# Patient Record
Sex: FEMALE | Race: WHITE | Employment: UNEMPLOYED | URBAN - METROPOLITAN AREA
[De-identification: names, ages, dates, MRNs, and addresses within clinical notes are randomized per-mention and may not be internally consistent; named-entity substitution may affect disease eponyms.]

---

## 2017-04-26 PROBLEM — Z34.90 TERM PREGNANCY: Status: ACTIVE | Noted: 2017-04-26

## 2019-05-20 LAB
ABO/RH: NORMAL
ANTIBODY SCREEN: NORMAL
HEPATITIS C ANTIBODY INTERPRETATION: NORMAL

## 2019-05-21 LAB
BASOPHILS ABSOLUTE: 0 K/UL (ref 0–0.2)
BASOPHILS RELATIVE PERCENT: 0.5 %
EOSINOPHILS ABSOLUTE: 0.1 K/UL (ref 0–0.6)
EOSINOPHILS RELATIVE PERCENT: 1.1 %
HCT VFR BLD CALC: 39.8 % (ref 36–48)
HEMOGLOBIN: 13.9 G/DL (ref 12–16)
HEPATITIS B SURFACE ANTIGEN INTERPRETATION: NORMAL
LYMPHOCYTES ABSOLUTE: 2.2 K/UL (ref 1–5.1)
LYMPHOCYTES RELATIVE PERCENT: 25.9 %
MCH RBC QN AUTO: 31.4 PG (ref 26–34)
MCHC RBC AUTO-ENTMCNC: 34.9 G/DL (ref 31–36)
MCV RBC AUTO: 89.9 FL (ref 80–100)
MONOCYTES ABSOLUTE: 0.4 K/UL (ref 0–1.3)
MONOCYTES RELATIVE PERCENT: 4.8 %
NEUTROPHILS ABSOLUTE: 5.6 K/UL (ref 1.7–7.7)
NEUTROPHILS RELATIVE PERCENT: 67.7 %
PDW BLD-RTO: 12.7 % (ref 12.4–15.4)
PLATELET # BLD: 238 K/UL (ref 135–450)
PMV BLD AUTO: 8.2 FL (ref 5–10.5)
RBC # BLD: 4.43 M/UL (ref 4–5.2)
RUBELLA ANTIBODY IGG: 78.4 IU/ML
TOTAL SYPHILLIS IGG/IGM: NORMAL
WBC # BLD: 8.3 K/UL (ref 4–11)

## 2019-05-22 LAB
MISCELLANEOUS LAB TEST ORDER: NORMAL
PARVOVIRUS B19 IGG ANTIBODY: 7.34 IV
URINE CULTURE, ROUTINE: NORMAL

## 2019-05-31 LAB — GONADOTROPIN, CHORIONIC (HCG) QUANT: 1992 MIU/ML

## 2019-06-05 LAB — GONADOTROPIN, CHORIONIC (HCG) QUANT: 1401 MIU/ML

## 2019-08-13 LAB
ABO/RH: NORMAL
ANTIBODY SCREEN: NORMAL
HEPATITIS C ANTIBODY INTERPRETATION: NORMAL

## 2019-08-14 LAB
BASOPHILS ABSOLUTE: 0.1 K/UL (ref 0–0.2)
BASOPHILS RELATIVE PERCENT: 0.6 %
EOSINOPHILS ABSOLUTE: 0.1 K/UL (ref 0–0.6)
EOSINOPHILS RELATIVE PERCENT: 0.7 %
HCT VFR BLD CALC: 39.3 % (ref 36–48)
HEMOGLOBIN: 13.5 G/DL (ref 12–16)
HEPATITIS B SURFACE ANTIGEN INTERPRETATION: NORMAL
HIV AG/AB: NORMAL
HIV ANTIGEN: NORMAL
HIV-1 ANTIBODY: NORMAL
HIV-2 AB: NORMAL
LYMPHOCYTES ABSOLUTE: 1.9 K/UL (ref 1–5.1)
LYMPHOCYTES RELATIVE PERCENT: 23.5 %
MCH RBC QN AUTO: 30.5 PG (ref 26–34)
MCHC RBC AUTO-ENTMCNC: 34.4 G/DL (ref 31–36)
MCV RBC AUTO: 88.6 FL (ref 80–100)
MONOCYTES ABSOLUTE: 0.3 K/UL (ref 0–1.3)
MONOCYTES RELATIVE PERCENT: 3.7 %
NEUTROPHILS ABSOLUTE: 5.9 K/UL (ref 1.7–7.7)
NEUTROPHILS RELATIVE PERCENT: 71.5 %
PDW BLD-RTO: 13 % (ref 12.4–15.4)
PLATELET # BLD: 240 K/UL (ref 135–450)
PMV BLD AUTO: 8.3 FL (ref 5–10.5)
RBC # BLD: 4.44 M/UL (ref 4–5.2)
RUBELLA ANTIBODY IGG: 78.6 IU/ML
TOTAL SYPHILLIS IGG/IGM: NORMAL
WBC # BLD: 8.3 K/UL (ref 4–11)

## 2019-08-15 LAB
PARVOVIRUS B19 IGG ANTIBODY: 7 IV
URINE CULTURE, ROUTINE: NORMAL

## 2019-12-26 LAB
GLUCOSE CHALLENGE: 109 MG/DL
HCT VFR BLD CALC: 35.5 % (ref 36–48)
HEMOGLOBIN: 12.2 G/DL (ref 12–16)
MCH RBC QN AUTO: 31.9 PG (ref 26–34)
MCHC RBC AUTO-ENTMCNC: 34.4 G/DL (ref 31–36)
MCV RBC AUTO: 92.5 FL (ref 80–100)
PDW BLD-RTO: 13.3 % (ref 12.4–15.4)
PLATELET # BLD: 217 K/UL (ref 135–450)
PMV BLD AUTO: 8.3 FL (ref 5–10.5)
RBC # BLD: 3.84 M/UL (ref 4–5.2)
WBC # BLD: 10.5 K/UL (ref 4–11)

## 2020-03-02 ENCOUNTER — HOSPITAL ENCOUNTER (OUTPATIENT)
Age: 25
Discharge: HOME | End: 2020-03-02
Attending: OBSTETRICS & GYNECOLOGY | Admitting: OBSTETRICS & GYNECOLOGY
Payer: COMMERCIAL

## 2020-03-02 VITALS
DIASTOLIC BLOOD PRESSURE: 61 MMHG | RESPIRATION RATE: 20 BRPM | WEIGHT: 217 LBS | HEIGHT: 64 IN | TEMPERATURE: 97.1 F | SYSTOLIC BLOOD PRESSURE: 119 MMHG | BODY MASS INDEX: 37.05 KG/M2 | HEART RATE: 64 BPM

## 2020-03-02 LAB
BILIRUBIN URINE: NEGATIVE
BLOOD, URINE: ABNORMAL
CLARITY: CLEAR
COLOR: YELLOW
EPITHELIAL CELLS, UA: NORMAL /HPF (ref 0–5)
GLUCOSE URINE: NEGATIVE MG/DL
KETONES, URINE: NEGATIVE MG/DL
LEUKOCYTE ESTERASE, URINE: NEGATIVE
MICROSCOPIC EXAMINATION: YES
NITRITE, URINE: NEGATIVE
PH UA: 7 (ref 5–8)
PROTEIN UA: NEGATIVE MG/DL
RBC UA: NORMAL /HPF (ref 0–4)
SPECIFIC GRAVITY UA: <1.005 (ref 1–1.03)
URINE TYPE: ABNORMAL
UROBILINOGEN, URINE: 0.2 E.U./DL
WBC UA: NORMAL /HPF (ref 0–5)

## 2020-03-02 PROCEDURE — 99211 OFF/OP EST MAY X REQ PHY/QHP: CPT

## 2020-03-02 PROCEDURE — 96372 THER/PROPH/DIAG INJ SC/IM: CPT

## 2020-03-02 PROCEDURE — 59025 FETAL NON-STRESS TEST: CPT

## 2020-03-02 PROCEDURE — 81001 URINALYSIS AUTO W/SCOPE: CPT

## 2020-03-02 PROCEDURE — 6360000002 HC RX W HCPCS

## 2020-03-02 RX ORDER — AMOXICILLIN 500 MG/1
500 CAPSULE ORAL 2 TIMES DAILY
Status: ON HOLD | COMMUNITY
End: 2020-04-06 | Stop reason: ALTCHOICE

## 2020-03-02 RX ORDER — TERBUTALINE SULFATE 1 MG/ML
INJECTION, SOLUTION SUBCUTANEOUS
Status: COMPLETED
Start: 2020-03-02 | End: 2020-03-02

## 2020-03-02 RX ADMIN — TERBUTALINE SULFATE: 1 INJECTION SUBCUTANEOUS at 22:05

## 2020-03-03 NOTE — FLOWSHEET NOTE
Patient to triage from home complaining of contractions today since noon.   Patient denies vaginal leaking or bleeding and is feeling good fetal movement

## 2020-03-03 NOTE — H&P
Department of Obstetrics and Gynecology   Obstetrics History and Physical        CHIEF COMPLAINT:  contractions    HISTORY OF PRESENT ILLNESS:      The patient is a 30 y.o. female at 34w1d. OB History        3    Para   1    Term   1       0    AB   1    Living   1       SAB   0    TAB   0    Ectopic   0    Molar        Multiple   0    Live Births   1            Patient presents with a chief complaint as above and is being admitted for observation. Onset of contractions around noon. She spent the day watching 6 children on her feet. SHe is well hydrated. Denies bleeding, leaking fluid, dysuria. Admits to diarrhea since starting amoxicillin for sinus infection. Cervix was closed and thick per RN. Her contractions seemed to be decreasing. Estimated Due Date: Estimated Date of Delivery: 20    PRENATAL CARE:    Complicated by: prior CS for FTP    PAST OB HISTORY  OB History        3    Para   1    Term   1       0    AB   1    Living   1       SAB   0    TAB   0    Ectopic   0    Molar        Multiple   0    Live Births   1                Past Medical History:        Diagnosis Date    Knee cap dislocation 2012    Mental disorder     depression-takes prozac    Tear of MCL (medial collateral ligament) of knee 2012     Past Surgical History:        Procedure Laterality Date     SECTION, LOW TRANSVERSE      repeat scheduled 20     Allergies:  Patient has no known allergies.   Social History:    Social History     Socioeconomic History    Marital status:      Spouse name: Not on file    Number of children: Not on file    Years of education: Not on file    Highest education level: Not on file   Occupational History    Not on file   Social Needs    Financial resource strain: Not on file    Food insecurity:     Worry: Not on file     Inability: Not on file    Transportation needs:     Medical: Not on file     Non-medical: Not on file   Tobacco Use    Smoking status: Never Smoker    Smokeless tobacco: Never Used   Substance and Sexual Activity    Alcohol use: No    Drug use: No    Sexual activity: Not on file   Lifestyle    Physical activity:     Days per week: Not on file     Minutes per session: Not on file    Stress: Not on file   Relationships    Social connections:     Talks on phone: Not on file     Gets together: Not on file     Attends Protestant service: Not on file     Active member of club or organization: Not on file     Attends meetings of clubs or organizations: Not on file     Relationship status: Not on file    Intimate partner violence:     Fear of current or ex partner: Not on file     Emotionally abused: Not on file     Physically abused: Not on file     Forced sexual activity: Not on file   Other Topics Concern    Not on file   Social History Narrative    Not on file     Family History:       Problem Relation Age of Onset    Asthma Brother     High Cholesterol Paternal Grandmother     High Blood Pressure Paternal Grandmother     High Blood Pressure Paternal Grandfather      Medications Prior to Admission:  Medications Prior to Admission: amoxicillin (AMOXIL) 500 MG capsule, Take 500 mg by mouth 2 times daily  Prenatal MV-Min-Fe Fum-FA-DHA (PRENATAL 1 PO), Take 1 tablet by mouth daily  ibuprofen (ADVIL;MOTRIN) 800 MG tablet, Take 1 pill 3 times daily with food for 3 days then as needed  famotidine (PEPCID) 10 MG tablet, Take 10 mg by mouth 2 times daily  ferrous sulfate 325 (65 FE) MG tablet, Take 325 mg by mouth daily (with breakfast)    REVIEW OF SYSTEMS:    Constitutional: negative  Gastrointestinal: positive for abdominal pain  Genitourinary:negative    PHYSICAL EXAM:  Vitals:    03/02/20 2106   BP: 119/61   Pulse: 64   Resp: 20   Temp: 97.1 °F (36.2 °C)   TempSrc: Temporal   Weight: 217 lb (98.4 kg)   Height: 5' 4\" (1.626 m)       /61   Pulse 64   Temp 97.1 °F (36.2 °C) (Temporal)   Resp 20   Ht 5' 4\" (1.626 m)   Wt 217 lb (98.4 kg)   LMP 2019   BMI 37.25 kg/m²   General appearance: alert, appears stated age, cooperative and no distress  Abdomen: gravid uterus- moderate contraction palpated  Skin: Skin color, texture, turgor normal. No rashes or lesions    Fetal heart rate:  Reassuring. Contraction frequency:  3 minutes    Membranes:  Intact    General Labs:      U/A:    Lab Results   Component Value Date    COLORU YELLOW 2020    PROTEINU Negative 2020    PHUR 7.0 2020    WBCUA 3-5 2013    RBCUA 0-2 2013    MUCUS Rare 2013    BACTERIA 1+ 2013    CLARITYU Clear 2020    SPECGRAV <1.005 2020    LEUKOCYTESUR Negative 2020    UROBILINOGEN 0.2 2020    BILIRUBINUR Negative 2020    BLOODU SMALL 2020    GLUCOSEU Negative 2020       ASSESSMENT AND PLAN:  [id number] contractions- treat with Brethine and observe.

## 2020-03-12 ENCOUNTER — HOSPITAL ENCOUNTER (OUTPATIENT)
Age: 25
Discharge: HOME | End: 2020-03-12
Attending: OBSTETRICS & GYNECOLOGY | Admitting: OBSTETRICS & GYNECOLOGY
Payer: COMMERCIAL

## 2020-03-12 VITALS
TEMPERATURE: 97.3 F | DIASTOLIC BLOOD PRESSURE: 59 MMHG | HEART RATE: 111 BPM | RESPIRATION RATE: 20 BRPM | SYSTOLIC BLOOD PRESSURE: 123 MMHG

## 2020-03-12 PROBLEM — O09.213 RISK OF PRETERM LABOR IN THIRD TRIMESTER: Status: ACTIVE | Noted: 2020-03-12

## 2020-03-12 LAB
BACTERIA: ABNORMAL /HPF
BILIRUBIN URINE: NEGATIVE
BLOOD, URINE: ABNORMAL
CLARITY: ABNORMAL
COLOR: YELLOW
EPITHELIAL CELLS, UA: ABNORMAL /HPF (ref 0–5)
GLUCOSE URINE: NEGATIVE MG/DL
KETONES, URINE: NEGATIVE MG/DL
LEUKOCYTE ESTERASE, URINE: ABNORMAL
MICROSCOPIC EXAMINATION: YES
NITRITE, URINE: NEGATIVE
PH UA: 7.5 (ref 5–8)
PROTEIN UA: NEGATIVE MG/DL
RBC UA: ABNORMAL /HPF (ref 0–4)
SPECIFIC GRAVITY UA: <1.005 (ref 1–1.03)
URINE TYPE: ABNORMAL
UROBILINOGEN, URINE: 0.2 E.U./DL
WBC UA: ABNORMAL /HPF (ref 0–5)

## 2020-03-12 PROCEDURE — 59025 FETAL NON-STRESS TEST: CPT

## 2020-03-12 PROCEDURE — 81001 URINALYSIS AUTO W/SCOPE: CPT

## 2020-03-12 RX ORDER — FLUOXETINE 10 MG/1
10 CAPSULE ORAL DAILY
COMMUNITY

## 2020-03-12 ASSESSMENT — PAIN DESCRIPTION - DESCRIPTORS: DESCRIPTORS: CRAMPING;TIGHTNESS

## 2020-04-06 ENCOUNTER — HOSPITAL ENCOUNTER (INPATIENT)
Age: 25
LOS: 2 days | Discharge: HOME | End: 2020-04-08
Attending: OBSTETRICS & GYNECOLOGY | Admitting: OBSTETRICS & GYNECOLOGY
Payer: COMMERCIAL

## 2020-04-06 ENCOUNTER — ANESTHESIA (OUTPATIENT)
Dept: LABOR AND DELIVERY | Age: 25
End: 2020-04-06
Payer: COMMERCIAL

## 2020-04-06 ENCOUNTER — ANESTHESIA EVENT (OUTPATIENT)
Dept: LABOR AND DELIVERY | Age: 25
End: 2020-04-06
Payer: COMMERCIAL

## 2020-04-06 VITALS
RESPIRATION RATE: 16 BRPM | DIASTOLIC BLOOD PRESSURE: 73 MMHG | SYSTOLIC BLOOD PRESSURE: 120 MMHG | OXYGEN SATURATION: 100 %

## 2020-04-06 PROBLEM — Z98.891 HX OF CESAREAN SECTION: Status: ACTIVE | Noted: 2020-04-06

## 2020-04-06 PROBLEM — Z98.891 S/P CESAREAN SECTION: Status: ACTIVE | Noted: 2020-04-06

## 2020-04-06 LAB
ABO/RH: NORMAL
AMPHETAMINE SCREEN, URINE: NORMAL
ANTIBODY SCREEN: NORMAL
BARBITURATE SCREEN URINE: NORMAL
BENZODIAZEPINE SCREEN, URINE: NORMAL
BUPRENORPHINE URINE: NORMAL
CANNABINOID SCREEN URINE: NORMAL
COCAINE METABOLITE SCREEN URINE: NORMAL
HCT VFR BLD CALC: 36.2 % (ref 36–48)
HEMOGLOBIN: 12.3 G/DL (ref 12–16)
Lab: NORMAL
MCH RBC QN AUTO: 31.5 PG (ref 26–34)
MCHC RBC AUTO-ENTMCNC: 34 G/DL (ref 31–36)
MCV RBC AUTO: 92.8 FL (ref 80–100)
METHADONE SCREEN, URINE: NORMAL
OPIATE SCREEN URINE: NORMAL
OXYCODONE URINE: NORMAL
PDW BLD-RTO: 13.5 % (ref 12.4–15.4)
PH UA: 6.5
PHENCYCLIDINE SCREEN URINE: NORMAL
PLATELET # BLD: 194 K/UL (ref 135–450)
PMV BLD AUTO: 8.6 FL (ref 5–10.5)
PROPOXYPHENE SCREEN: NORMAL
RBC # BLD: 3.9 M/UL (ref 4–5.2)
TOTAL SYPHILLIS IGG/IGM: NORMAL
WBC # BLD: 12 K/UL (ref 4–11)

## 2020-04-06 PROCEDURE — 2500000003 HC RX 250 WO HCPCS: Performed by: NURSE ANESTHETIST, CERTIFIED REGISTERED

## 2020-04-06 PROCEDURE — 86850 RBC ANTIBODY SCREEN: CPT

## 2020-04-06 PROCEDURE — 1200000000 HC SEMI PRIVATE

## 2020-04-06 PROCEDURE — 6360000002 HC RX W HCPCS: Performed by: OBSTETRICS & GYNECOLOGY

## 2020-04-06 PROCEDURE — 6360000002 HC RX W HCPCS: Performed by: NURSE ANESTHETIST, CERTIFIED REGISTERED

## 2020-04-06 PROCEDURE — 6370000000 HC RX 637 (ALT 250 FOR IP): Performed by: OBSTETRICS & GYNECOLOGY

## 2020-04-06 PROCEDURE — 7100000001 HC PACU RECOVERY - ADDTL 15 MIN: Performed by: OBSTETRICS & GYNECOLOGY

## 2020-04-06 PROCEDURE — 2580000003 HC RX 258: Performed by: OBSTETRICS & GYNECOLOGY

## 2020-04-06 PROCEDURE — 80307 DRUG TEST PRSMV CHEM ANLYZR: CPT

## 2020-04-06 PROCEDURE — 2709999900 HC NON-CHARGEABLE SUPPLY: Performed by: OBSTETRICS & GYNECOLOGY

## 2020-04-06 PROCEDURE — 85027 COMPLETE CBC AUTOMATED: CPT

## 2020-04-06 PROCEDURE — 2500000003 HC RX 250 WO HCPCS: Performed by: OBSTETRICS & GYNECOLOGY

## 2020-04-06 PROCEDURE — 86901 BLOOD TYPING SEROLOGIC RH(D): CPT

## 2020-04-06 PROCEDURE — 3700000001 HC ADD 15 MINUTES (ANESTHESIA): Performed by: OBSTETRICS & GYNECOLOGY

## 2020-04-06 PROCEDURE — 86900 BLOOD TYPING SEROLOGIC ABO: CPT

## 2020-04-06 PROCEDURE — 7100000000 HC PACU RECOVERY - FIRST 15 MIN: Performed by: OBSTETRICS & GYNECOLOGY

## 2020-04-06 PROCEDURE — 86780 TREPONEMA PALLIDUM: CPT

## 2020-04-06 PROCEDURE — 36415 COLL VENOUS BLD VENIPUNCTURE: CPT

## 2020-04-06 PROCEDURE — 3609079900 HC CESAREAN SECTION: Performed by: OBSTETRICS & GYNECOLOGY

## 2020-04-06 PROCEDURE — 3700000000 HC ANESTHESIA ATTENDED CARE: Performed by: OBSTETRICS & GYNECOLOGY

## 2020-04-06 RX ORDER — PHENYLEPHRINE HYDROCHLORIDE 10 MG/ML
INJECTION INTRAVENOUS PRN
Status: DISCONTINUED | OUTPATIENT
Start: 2020-04-06 | End: 2020-04-06 | Stop reason: SDUPTHER

## 2020-04-06 RX ORDER — ACETAMINOPHEN 500 MG
1000 TABLET ORAL ONCE
Status: COMPLETED | OUTPATIENT
Start: 2020-04-06 | End: 2020-04-06

## 2020-04-06 RX ORDER — IBUPROFEN 800 MG/1
800 TABLET ORAL EVERY 8 HOURS SCHEDULED
Status: DISCONTINUED | OUTPATIENT
Start: 2020-04-06 | End: 2020-04-08 | Stop reason: HOSPADM

## 2020-04-06 RX ORDER — ONDANSETRON 2 MG/ML
INJECTION INTRAMUSCULAR; INTRAVENOUS PRN
Status: DISCONTINUED | OUTPATIENT
Start: 2020-04-06 | End: 2020-04-06 | Stop reason: SDUPTHER

## 2020-04-06 RX ORDER — MORPHINE SULFATE 10 MG/ML
2 INJECTION, SOLUTION INTRAMUSCULAR; INTRAVENOUS
Status: DISCONTINUED | OUTPATIENT
Start: 2020-04-06 | End: 2020-04-08 | Stop reason: HOSPADM

## 2020-04-06 RX ORDER — SODIUM CHLORIDE 0.9 % (FLUSH) 0.9 %
10 SYRINGE (ML) INJECTION EVERY 12 HOURS SCHEDULED
Status: DISCONTINUED | OUTPATIENT
Start: 2020-04-06 | End: 2020-04-08 | Stop reason: HOSPADM

## 2020-04-06 RX ORDER — IBUPROFEN 800 MG/1
800 TABLET ORAL EVERY 6 HOURS PRN
Qty: 120 TABLET | Refills: 3 | Status: SHIPPED | OUTPATIENT
Start: 2020-04-06 | End: 2021-04-04

## 2020-04-06 RX ORDER — LANOLIN 100 %
OINTMENT (GRAM) TOPICAL
Status: DISCONTINUED | OUTPATIENT
Start: 2020-04-06 | End: 2020-04-08 | Stop reason: HOSPADM

## 2020-04-06 RX ORDER — ACETAMINOPHEN 500 MG
1000 TABLET ORAL EVERY 8 HOURS SCHEDULED
Status: DISCONTINUED | OUTPATIENT
Start: 2020-04-06 | End: 2020-04-08 | Stop reason: HOSPADM

## 2020-04-06 RX ORDER — CEPHALEXIN 250 MG/1
500 CAPSULE ORAL EVERY 8 HOURS SCHEDULED
Status: COMPLETED | OUTPATIENT
Start: 2020-04-06 | End: 2020-04-08

## 2020-04-06 RX ORDER — KETOROLAC TROMETHAMINE 30 MG/ML
30 INJECTION, SOLUTION INTRAMUSCULAR; INTRAVENOUS EVERY 8 HOURS
Status: DISCONTINUED | OUTPATIENT
Start: 2020-04-06 | End: 2020-04-08 | Stop reason: HOSPADM

## 2020-04-06 RX ORDER — DOCUSATE SODIUM 100 MG/1
100 CAPSULE, LIQUID FILLED ORAL DAILY PRN
Qty: 60 CAPSULE | Refills: 1 | Status: SHIPPED | OUTPATIENT
Start: 2020-04-06

## 2020-04-06 RX ORDER — DOCUSATE SODIUM 100 MG/1
100 CAPSULE, LIQUID FILLED ORAL 2 TIMES DAILY
Status: DISCONTINUED | OUTPATIENT
Start: 2020-04-06 | End: 2020-04-08 | Stop reason: HOSPADM

## 2020-04-06 RX ORDER — TRISODIUM CITRATE DIHYDRATE AND CITRIC ACID MONOHYDRATE 500; 334 MG/5ML; MG/5ML
30 SOLUTION ORAL ONCE
Status: COMPLETED | OUTPATIENT
Start: 2020-04-06 | End: 2020-04-06

## 2020-04-06 RX ORDER — SODIUM CHLORIDE, SODIUM LACTATE, POTASSIUM CHLORIDE, AND CALCIUM CHLORIDE .6; .31; .03; .02 G/100ML; G/100ML; G/100ML; G/100ML
1000 INJECTION, SOLUTION INTRAVENOUS ONCE
Status: COMPLETED | OUTPATIENT
Start: 2020-04-06 | End: 2020-04-06

## 2020-04-06 RX ORDER — SODIUM CHLORIDE, SODIUM LACTATE, POTASSIUM CHLORIDE, CALCIUM CHLORIDE 600; 310; 30; 20 MG/100ML; MG/100ML; MG/100ML; MG/100ML
INJECTION, SOLUTION INTRAVENOUS CONTINUOUS
Status: DISCONTINUED | OUTPATIENT
Start: 2020-04-06 | End: 2020-04-06

## 2020-04-06 RX ORDER — POLYETHYLENE GLYCOL 3350 17 G/17G
17 POWDER, FOR SOLUTION ORAL DAILY
Status: DISCONTINUED | OUTPATIENT
Start: 2020-04-06 | End: 2020-04-08 | Stop reason: HOSPADM

## 2020-04-06 RX ORDER — OXYCODONE HYDROCHLORIDE 5 MG/1
10 TABLET ORAL EVERY 4 HOURS PRN
Status: DISCONTINUED | OUTPATIENT
Start: 2020-04-06 | End: 2020-04-08 | Stop reason: HOSPADM

## 2020-04-06 RX ORDER — DIPHENHYDRAMINE HYDROCHLORIDE 50 MG/ML
25 INJECTION INTRAMUSCULAR; INTRAVENOUS EVERY 6 HOURS PRN
Status: DISCONTINUED | OUTPATIENT
Start: 2020-04-06 | End: 2020-04-08 | Stop reason: HOSPADM

## 2020-04-06 RX ORDER — METOCLOPRAMIDE HYDROCHLORIDE 5 MG/ML
10 INJECTION INTRAMUSCULAR; INTRAVENOUS ONCE
Status: COMPLETED | OUTPATIENT
Start: 2020-04-06 | End: 2020-04-06

## 2020-04-06 RX ORDER — ACETAMINOPHEN 325 MG/1
650 TABLET ORAL EVERY 4 HOURS PRN
Status: DISCONTINUED | OUTPATIENT
Start: 2020-04-06 | End: 2020-04-08 | Stop reason: HOSPADM

## 2020-04-06 RX ORDER — METHYLERGONOVINE MALEATE 0.2 MG/ML
200 INJECTION INTRAVENOUS PRN
Status: DISCONTINUED | OUTPATIENT
Start: 2020-04-06 | End: 2020-04-08 | Stop reason: HOSPADM

## 2020-04-06 RX ORDER — DEXAMETHASONE SODIUM PHOSPHATE 4 MG/ML
INJECTION, SOLUTION INTRA-ARTICULAR; INTRALESIONAL; INTRAMUSCULAR; INTRAVENOUS; SOFT TISSUE PRN
Status: DISCONTINUED | OUTPATIENT
Start: 2020-04-06 | End: 2020-04-06 | Stop reason: SDUPTHER

## 2020-04-06 RX ORDER — OXYCODONE HYDROCHLORIDE 5 MG/1
5 TABLET ORAL EVERY 4 HOURS PRN
Status: DISCONTINUED | OUTPATIENT
Start: 2020-04-06 | End: 2020-04-08 | Stop reason: HOSPADM

## 2020-04-06 RX ORDER — SIMETHICONE 80 MG
80 TABLET,CHEWABLE ORAL EVERY 6 HOURS PRN
Status: DISCONTINUED | OUTPATIENT
Start: 2020-04-06 | End: 2020-04-08 | Stop reason: HOSPADM

## 2020-04-06 RX ORDER — FERROUS SULFATE 325(65) MG
325 TABLET ORAL 2 TIMES DAILY WITH MEALS
Status: DISCONTINUED | OUTPATIENT
Start: 2020-04-06 | End: 2020-04-08 | Stop reason: HOSPADM

## 2020-04-06 RX ORDER — KETOROLAC TROMETHAMINE 30 MG/ML
INJECTION, SOLUTION INTRAMUSCULAR; INTRAVENOUS PRN
Status: DISCONTINUED | OUTPATIENT
Start: 2020-04-06 | End: 2020-04-06 | Stop reason: SDUPTHER

## 2020-04-06 RX ORDER — ACETAMINOPHEN 500 MG
1000 TABLET ORAL EVERY 6 HOURS PRN
Qty: 120 TABLET | Refills: 3 | Status: SHIPPED | OUTPATIENT
Start: 2020-04-06

## 2020-04-06 RX ORDER — SODIUM CHLORIDE 0.9 % (FLUSH) 0.9 %
10 SYRINGE (ML) INJECTION PRN
Status: DISCONTINUED | OUTPATIENT
Start: 2020-04-06 | End: 2020-04-08 | Stop reason: HOSPADM

## 2020-04-06 RX ORDER — FLUOXETINE 10 MG/1
10 CAPSULE ORAL DAILY
Status: DISCONTINUED | OUTPATIENT
Start: 2020-04-06 | End: 2020-04-08 | Stop reason: HOSPADM

## 2020-04-06 RX ORDER — MORPHINE SULFATE 1 MG/ML
INJECTION, SOLUTION EPIDURAL; INTRATHECAL; INTRAVENOUS PRN
Status: DISCONTINUED | OUTPATIENT
Start: 2020-04-06 | End: 2020-04-06 | Stop reason: SDUPTHER

## 2020-04-06 RX ORDER — METRONIDAZOLE 250 MG/1
500 TABLET ORAL EVERY 8 HOURS SCHEDULED
Status: COMPLETED | OUTPATIENT
Start: 2020-04-06 | End: 2020-04-08

## 2020-04-06 RX ORDER — ONDANSETRON 2 MG/ML
4 INJECTION INTRAMUSCULAR; INTRAVENOUS EVERY 6 HOURS PRN
Status: DISCONTINUED | OUTPATIENT
Start: 2020-04-06 | End: 2020-04-08 | Stop reason: HOSPADM

## 2020-04-06 RX ORDER — BUPIVACAINE HYDROCHLORIDE 7.5 MG/ML
INJECTION, SOLUTION INTRASPINAL PRN
Status: DISCONTINUED | OUTPATIENT
Start: 2020-04-06 | End: 2020-04-06 | Stop reason: SDUPTHER

## 2020-04-06 RX ORDER — PRENATAL VIT/IRON FUM/FOLIC AC 27MG-0.8MG
1 TABLET ORAL DAILY
Status: DISCONTINUED | OUTPATIENT
Start: 2020-04-06 | End: 2020-04-08 | Stop reason: HOSPADM

## 2020-04-06 RX ORDER — CARBOPROST TROMETHAMINE 250 UG/ML
250 INJECTION, SOLUTION INTRAMUSCULAR PRN
Status: DISCONTINUED | OUTPATIENT
Start: 2020-04-06 | End: 2020-04-08 | Stop reason: HOSPADM

## 2020-04-06 RX ORDER — FAMOTIDINE 20 MG/1
20 TABLET, FILM COATED ORAL 2 TIMES DAILY
Status: DISCONTINUED | OUTPATIENT
Start: 2020-04-06 | End: 2020-04-08 | Stop reason: HOSPADM

## 2020-04-06 RX ORDER — OXYCODONE HYDROCHLORIDE 5 MG/1
5 CAPSULE ORAL EVERY 4 HOURS PRN
Qty: 20 CAPSULE | Refills: 0 | Status: SHIPPED | OUTPATIENT
Start: 2020-04-06 | End: 2020-04-13

## 2020-04-06 RX ORDER — KETOROLAC TROMETHAMINE 30 MG/ML
30 INJECTION, SOLUTION INTRAMUSCULAR; INTRAVENOUS EVERY 6 HOURS
Status: DISCONTINUED | OUTPATIENT
Start: 2020-04-06 | End: 2020-04-06 | Stop reason: SDUPTHER

## 2020-04-06 RX ORDER — SODIUM CHLORIDE, SODIUM LACTATE, POTASSIUM CHLORIDE, CALCIUM CHLORIDE 600; 310; 30; 20 MG/100ML; MG/100ML; MG/100ML; MG/100ML
INJECTION, SOLUTION INTRAVENOUS CONTINUOUS
Status: DISCONTINUED | OUTPATIENT
Start: 2020-04-06 | End: 2020-04-08 | Stop reason: HOSPADM

## 2020-04-06 RX ADMIN — SODIUM CHLORIDE, POTASSIUM CHLORIDE, SODIUM LACTATE AND CALCIUM CHLORIDE: 600; 310; 30; 20 INJECTION, SOLUTION INTRAVENOUS at 12:57

## 2020-04-06 RX ADMIN — SODIUM CITRATE AND CITRIC ACID MONOHYDRATE 30 ML: 500; 334 SOLUTION ORAL at 06:38

## 2020-04-06 RX ADMIN — MORPHINE SULFATE 0.2 MG: 1 INJECTION EPIDURAL; INTRATHECAL; INTRAVENOUS at 09:24

## 2020-04-06 RX ADMIN — ACETAMINOPHEN 1000 MG: 500 TABLET, FILM COATED ORAL at 16:41

## 2020-04-06 RX ADMIN — METOCLOPRAMIDE HYDROCHLORIDE 10 MG: 5 INJECTION INTRAMUSCULAR; INTRAVENOUS at 06:38

## 2020-04-06 RX ADMIN — SODIUM CHLORIDE, POTASSIUM CHLORIDE, SODIUM LACTATE AND CALCIUM CHLORIDE 1000 ML: 600; 310; 30; 20 INJECTION, SOLUTION INTRAVENOUS at 06:28

## 2020-04-06 RX ADMIN — BUPIVACAINE HYDROCHLORIDE 1.6 ML: 7.5 INJECTION, SOLUTION INTRASPINAL at 09:24

## 2020-04-06 RX ADMIN — DEXAMETHASONE SODIUM PHOSPHATE 8 MG: 4 INJECTION, SOLUTION INTRAMUSCULAR; INTRAVENOUS at 09:31

## 2020-04-06 RX ADMIN — ONDANSETRON 4 MG: 2 INJECTION INTRAMUSCULAR; INTRAVENOUS at 09:31

## 2020-04-06 RX ADMIN — FLUOXETINE 10 MG: 10 CAPSULE ORAL at 14:49

## 2020-04-06 RX ADMIN — SODIUM CHLORIDE, POTASSIUM CHLORIDE, SODIUM LACTATE AND CALCIUM CHLORIDE: 600; 310; 30; 20 INJECTION, SOLUTION INTRAVENOUS at 20:58

## 2020-04-06 RX ADMIN — PHENYLEPHRINE HYDROCHLORIDE 100 MCG: 10 INJECTION INTRAVENOUS at 09:31

## 2020-04-06 RX ADMIN — SODIUM CHLORIDE, POTASSIUM CHLORIDE, SODIUM LACTATE AND CALCIUM CHLORIDE: 600; 310; 30; 20 INJECTION, SOLUTION INTRAVENOUS at 06:52

## 2020-04-06 RX ADMIN — KETOROLAC TROMETHAMINE 30 MG: 30 INJECTION, SOLUTION INTRAMUSCULAR at 18:55

## 2020-04-06 RX ADMIN — FAMOTIDINE 20 MG: 10 INJECTION, SOLUTION INTRAVENOUS at 06:38

## 2020-04-06 RX ADMIN — SODIUM CHLORIDE, POTASSIUM CHLORIDE, SODIUM LACTATE AND CALCIUM CHLORIDE: 600; 310; 30; 20 INJECTION, SOLUTION INTRAVENOUS at 09:35

## 2020-04-06 RX ADMIN — KETOROLAC TROMETHAMINE 30 MG: 30 INJECTION, SOLUTION INTRAMUSCULAR; INTRAVENOUS at 10:15

## 2020-04-06 RX ADMIN — CEFAZOLIN 2 G: 10 INJECTION, POWDER, FOR SOLUTION INTRAVENOUS at 07:30

## 2020-04-06 RX ADMIN — CEPHALEXIN 500 MG: 250 CAPSULE ORAL at 14:49

## 2020-04-06 RX ADMIN — CEPHALEXIN 500 MG: 250 CAPSULE ORAL at 22:45

## 2020-04-06 RX ADMIN — METRONIDAZOLE 500 MG: 250 TABLET, FILM COATED ORAL at 14:49

## 2020-04-06 RX ADMIN — METRONIDAZOLE 500 MG: 250 TABLET, FILM COATED ORAL at 22:44

## 2020-04-06 RX ADMIN — ACETAMINOPHEN 1000 MG: 500 TABLET, FILM COATED ORAL at 06:38

## 2020-04-06 RX ADMIN — SODIUM CHLORIDE, POTASSIUM CHLORIDE, SODIUM LACTATE AND CALCIUM CHLORIDE: 600; 310; 30; 20 INJECTION, SOLUTION INTRAVENOUS at 12:45

## 2020-04-06 ASSESSMENT — PULMONARY FUNCTION TESTS
PIF_VALUE: 0
PIF_VALUE: 1
PIF_VALUE: 0
PIF_VALUE: 1
PIF_VALUE: 0
PIF_VALUE: 2
PIF_VALUE: 0

## 2020-04-06 ASSESSMENT — PAIN SCALES - GENERAL
PAINLEVEL_OUTOF10: 0
PAINLEVEL_OUTOF10: 3

## 2020-04-06 NOTE — FLOWSHEET NOTE
Pt up to chair with stand-by assist; tolerated well. Call light within reach, instructed to call for assistance back to bed, verbalized understanding. Will continue to monitor.

## 2020-04-06 NOTE — OP NOTE
massage was then used to  deliver the placenta. Uterus was then cleared of all clots and debris. Uterine incision was closed with two layers of 0 Vicryl. Some bleeding  from the superior aspect of suture site was controlled with 3-0 Vicryl  figure-of-eight suture. Abdomen was then irrigated, cleared of all  clots and debris. The uterine incision was then reinspected. A piece  of Interceed was then placed over the uterine incision. Gallo  protractor was removed. The peritoneum and muscle were then  reapproximated with 3-0 Vicryl. Fascia was then inspected and found to  be hemostatic. This was then closed with a running looped 0 PDS. The  subcutaneous tissue was then undermined to allow more cosmetic closure. This was undermined with Bovie, it was hemostatic. Then using running  3-0 Vicryl the Perla's fascia was then reapproximated to relieve  tension from the skin. Once this was done, the scar tissue from the  prior skin incision was then removed with Metzenbaum scissors. The skin  was then closed with running 4-0 Monocryl and was hemostatic. Pressure  dressing was then applied. The patient tolerated the procedure well,  went to PACU in stable condition.         Antonia Skinner MD    D: 04/06/2020 10:41:06       T: 04/06/2020 12:33:42     [id number]  Job#: [id number]     Doc#: [id number]    CC:

## 2020-04-06 NOTE — FLOWSHEET NOTE
Discharge prescriptions given to pt for Motrin, colace, Acetaminophen,and oxycodone with instructions on use and side effects. See AVS.  Pt verbalized understanding of medications. Discharge prescriptions given to pt with instructions on use and side effects. See AVS.  Pt verbalized understanding of medications.

## 2020-04-07 LAB
HCT VFR BLD CALC: 33.3 % (ref 36–48)
HEMOGLOBIN: 11.2 G/DL (ref 12–16)
MCH RBC QN AUTO: 31.1 PG (ref 26–34)
MCHC RBC AUTO-ENTMCNC: 33.7 G/DL (ref 31–36)
MCV RBC AUTO: 92.2 FL (ref 80–100)
PDW BLD-RTO: 13.3 % (ref 12.4–15.4)
PLATELET # BLD: 198 K/UL (ref 135–450)
PMV BLD AUTO: 8.7 FL (ref 5–10.5)
RBC # BLD: 3.62 M/UL (ref 4–5.2)
WBC # BLD: 18.8 K/UL (ref 4–11)

## 2020-04-07 PROCEDURE — 6360000002 HC RX W HCPCS: Performed by: OBSTETRICS & GYNECOLOGY

## 2020-04-07 PROCEDURE — 2580000003 HC RX 258: Performed by: OBSTETRICS & GYNECOLOGY

## 2020-04-07 PROCEDURE — 6370000000 HC RX 637 (ALT 250 FOR IP): Performed by: OBSTETRICS & GYNECOLOGY

## 2020-04-07 PROCEDURE — 1200000000 HC SEMI PRIVATE

## 2020-04-07 PROCEDURE — 85027 COMPLETE CBC AUTOMATED: CPT

## 2020-04-07 RX ADMIN — CEPHALEXIN 500 MG: 250 CAPSULE ORAL at 06:58

## 2020-04-07 RX ADMIN — FAMOTIDINE 20 MG: 20 TABLET, FILM COATED ORAL at 21:32

## 2020-04-07 RX ADMIN — DOCUSATE SODIUM 100 MG: 100 CAPSULE ORAL at 21:32

## 2020-04-07 RX ADMIN — ACETAMINOPHEN 1000 MG: 500 TABLET, FILM COATED ORAL at 16:23

## 2020-04-07 RX ADMIN — IBUPROFEN 800 MG: 800 TABLET, FILM COATED ORAL at 10:57

## 2020-04-07 RX ADMIN — OXYCODONE 5 MG: 5 TABLET ORAL at 23:02

## 2020-04-07 RX ADMIN — FLUOXETINE 10 MG: 10 CAPSULE ORAL at 09:11

## 2020-04-07 RX ADMIN — ACETAMINOPHEN 1000 MG: 500 TABLET, FILM COATED ORAL at 00:23

## 2020-04-07 RX ADMIN — METRONIDAZOLE 500 MG: 250 TABLET, FILM COATED ORAL at 06:58

## 2020-04-07 RX ADMIN — METRONIDAZOLE 500 MG: 250 TABLET, FILM COATED ORAL at 14:59

## 2020-04-07 RX ADMIN — CEPHALEXIN 500 MG: 250 CAPSULE ORAL at 21:32

## 2020-04-07 RX ADMIN — METRONIDAZOLE 500 MG: 250 TABLET, FILM COATED ORAL at 21:32

## 2020-04-07 RX ADMIN — Medication 10 ML: at 21:33

## 2020-04-07 RX ADMIN — ACETAMINOPHEN 1000 MG: 500 TABLET, FILM COATED ORAL at 23:54

## 2020-04-07 RX ADMIN — CEPHALEXIN 500 MG: 250 CAPSULE ORAL at 14:59

## 2020-04-07 RX ADMIN — DOCUSATE SODIUM 100 MG: 100 CAPSULE ORAL at 08:10

## 2020-04-07 RX ADMIN — ACETAMINOPHEN 1000 MG: 500 TABLET, FILM COATED ORAL at 08:10

## 2020-04-07 RX ADMIN — KETOROLAC TROMETHAMINE 30 MG: 30 INJECTION, SOLUTION INTRAMUSCULAR at 03:23

## 2020-04-07 RX ADMIN — IBUPROFEN 800 MG: 800 TABLET, FILM COATED ORAL at 18:54

## 2020-04-07 ASSESSMENT — PAIN SCALES - GENERAL
PAINLEVEL_OUTOF10: 4
PAINLEVEL_OUTOF10: 2
PAINLEVEL_OUTOF10: 1
PAINLEVEL_OUTOF10: 1
PAINLEVEL_OUTOF10: 3
PAINLEVEL_OUTOF10: 6
PAINLEVEL_OUTOF10: 3
PAINLEVEL_OUTOF10: 1

## 2020-04-07 NOTE — PLAN OF CARE
Jane Maier RN  Outcome: Ongoing     Problem: Urinary Retention:  Goal: Urinary elimination within specified parameters  Description: Urinary elimination within specified parameters  4/7/2020 0146 by Rochelle Chavarria RN  Outcome: Ongoing  4/6/2020 1459 by Kathy Hoskins RN  Outcome: Ongoing     Problem: Venous Thromboembolism:  Goal: Will show no signs or symptoms of venous thromboembolism  Description: Will show no signs or symptoms of venous thromboembolism  4/7/2020 0146 by Rochelle Chavarria RN  Outcome: Ongoing  4/6/2020 1459 by Kathy Hoskins RN  Outcome: Ongoing  Goal: Absence of signs or symptoms of impaired coagulation  Description: Absence of signs or symptoms of impaired coagulation  4/7/2020 0146 by Rochelle Chavarria RN  Outcome: Ongoing  4/6/2020 1459 by Kathy Hoskins RN  Outcome: Ongoing     Problem: Pain:  Goal: Pain level will decrease  Description: Pain level will decrease  4/7/2020 0146 by Rochelle Chavarria RN  Outcome: Ongoing  4/6/2020 1459 by Kathy Hoskins RN  Outcome: Ongoing  Goal: Control of acute pain  Description: Control of acute pain  Outcome: Ongoing  Goal: Control of chronic pain  Description: Control of chronic pain  Outcome: Ongoing     Problem: Anxiety:  Goal: Level of anxiety will decrease  Description: Level of anxiety will decrease  Outcome: Completed     Problem: Aspiration - Risk of:  Goal: Absence of aspiration  Description: Absence of aspiration  Outcome: Completed     Problem: Tissue Perfusion - Uteroplacental, Altered:  Goal: Absence of abnormal fetal heart rate pattern  Description: Absence of abnormal fetal heart rate pattern  Outcome: Completed

## 2020-04-07 NOTE — FLOWSHEET NOTE
Pt voided 500cc without any difficulties. Pt has been ambulating well in room by herself. No needs expressed at this time.

## 2020-04-07 NOTE — LACTATION NOTE
This note was copied from a baby's chart. LACTATION PROGRESS NOTE    Followed up on Baby Girl Zamzam Galeana to check on feeding status and inform family of [address] [facility] availability. MOB was finishing feeding as this LC entered room. Observed brief latch; NANCY, AS, SRS. MOB reported NB fed at breast  Over last hour; both sides, without difficulty. MOB reported another wet/stool diaper. MOB denies concerns with direct breastfeeding at present time. This  LC offered services, if needed while MB is in patient. MOB verbalized understanding, and denies needs.

## 2020-04-08 VITALS
SYSTOLIC BLOOD PRESSURE: 107 MMHG | HEIGHT: 64 IN | DIASTOLIC BLOOD PRESSURE: 70 MMHG | RESPIRATION RATE: 18 BRPM | HEART RATE: 58 BPM | BODY MASS INDEX: 37.73 KG/M2 | WEIGHT: 221 LBS | TEMPERATURE: 97.9 F | OXYGEN SATURATION: 97 %

## 2020-04-08 PROBLEM — O09.213 RISK OF PRETERM LABOR IN THIRD TRIMESTER: Status: RESOLVED | Noted: 2020-03-12 | Resolved: 2020-04-08

## 2020-04-08 PROBLEM — Z34.90 TERM PREGNANCY: Status: RESOLVED | Noted: 2017-04-26 | Resolved: 2020-04-08

## 2020-04-08 PROCEDURE — 6370000000 HC RX 637 (ALT 250 FOR IP): Performed by: OBSTETRICS & GYNECOLOGY

## 2020-04-08 RX ADMIN — IBUPROFEN 800 MG: 800 TABLET, FILM COATED ORAL at 03:00

## 2020-04-08 RX ADMIN — DOCUSATE SODIUM 100 MG: 100 CAPSULE ORAL at 08:50

## 2020-04-08 RX ADMIN — FLUOXETINE 10 MG: 10 CAPSULE ORAL at 08:58

## 2020-04-08 RX ADMIN — ACETAMINOPHEN 1000 MG: 500 TABLET, FILM COATED ORAL at 08:50

## 2020-04-08 RX ADMIN — OXYCODONE 5 MG: 5 TABLET ORAL at 08:51

## 2020-04-08 RX ADMIN — CEPHALEXIN 500 MG: 250 CAPSULE ORAL at 05:54

## 2020-04-08 RX ADMIN — METRONIDAZOLE 500 MG: 250 TABLET, FILM COATED ORAL at 05:54

## 2020-04-08 ASSESSMENT — PAIN SCALES - GENERAL
PAINLEVEL_OUTOF10: 2
PAINLEVEL_OUTOF10: 6

## 2020-04-08 NOTE — PROGRESS NOTES
IVF's, peck catheter,  And pulse ox discontinued at this time. CBC drawn and sent to lab. Assisted up to the BR and did pericare independently and returned to bed.
Subjective:     Postpartum Day 2:  Delivery    The patient feels well. The patient denies emotional concerns. Pain is well controlled with current medications. The baby iswell. Baby is feeding via breast. Urinary output is adequate. The patient is ambulating well. The patient is tolerating a normal diet. Flatus has been passed. Objective:      No data found. General:    alert, appears stated age and cooperative       Lochia:  appropriate   Uterine Fundus:   firm   Incision:  healing well, no significant drainage, no dehiscence, no significant erythema   DVT Evaluation:  No evidence of DVT seen on physical exam.     Lab Results   Component Value Date    WBC 18.8 (H) 2020    HGB 11.2 (L) 2020    HCT 33.3 (L) 2020    MCV 92.2 2020     2020        Assessment:     Status post  section. Doing well postoperatively. Plan:     Discharge home with standard precautions and return to clinic in 4-6 weeks.
mg, Oral, Q4H PRN, Ketan Black MD    acetaminophen (TYLENOL) tablet 650 mg, 650 mg, Oral, Q4H PRN, Ketan Black MD    docusate sodium (COLACE) capsule 100 mg, 100 mg, Oral, BID, Ketan Black MD, 100 mg at 04/07/20 0810    oxytocin (PITOCIN) 30 units in 500 mL infusion, 1 jeffery-units/min, Intravenous, Continuous, Ketan Black MD    methylergonovine (METHERGINE) injection 200 mcg, 200 mcg, Intramuscular, PRN, Ketan Black MD    carboprost (HEMABATE) injection 250 mcg, 250 mcg, Intramuscular, PRN, Ketan Black MD    lanolin ointment, , Topical, Q1H PRN, Ketan Black MD    measles, mumps & rubella vaccine (MMR) injection 0.5 mL, 0.5 mL, Subcutaneous, Prior to discharge, Ketan Black MD    Tetanus-Diphth-Acell Pertussis (BOOSTRIX) injection 0.5 mL, 0.5 mL, Intramuscular, Prior to discharge, Ketan Black MD    morphine injection 2 mg, 2 mg, Intravenous, Q3H PRN, Ketan Black MD    [facility]) chewable tablet 80 mg, 80 mg, Oral, Q6H PRN, Ketan Black MD    polyethylene glycol [facility]) packet 17 g, 17 g, Oral, Daily, Ketan Black MD    magnesium hydroxide (MILK OF MAGNESIA) 400 MG/5ML suspension 30 mL, 30 mL, Oral, Daily PRN, Ketan Black MD    diphenhydrAMINE (BENADRYL) injection 25 mg, 25 mg, Intravenous, Q6H PRN, Ketan Black MD    ondansetron [facility]) injection 4 mg, 4 mg, Intravenous, Q6H PRN, Ketan Black MD    famotidine (PEPCID) tablet 20 mg, 20 mg, Oral, BID, Ketan Black MD    prenatal vitamin tablet 1 tablet, 1 tablet, Oral, Daily, Ketan Black MD    ferrous sulfate (IRON 325) tablet 325 mg, 325 mg, Oral, BID WC, Ketan Black MD    cephALEXin [facility]) capsule 500 mg, 500 mg, Oral, 3 times per day, 500 mg at 04/07/20 0658 **AND** metroNIDAZOLE (FLAGYL) tablet 500 mg, 500 mg, Oral, 3 times per day, Ketan Black MD, 500 mg at 04/07/20 4203     Assessment/Plan:      Post

## 2020-04-09 ENCOUNTER — FOLLOWUP TELEPHONE ENCOUNTER (OUTPATIENT)
Dept: OBGYN | Age: 25
End: 2020-04-09

## 2020-04-14 ENCOUNTER — FOLLOWUP TELEPHONE ENCOUNTER (OUTPATIENT)
Dept: OBGYN | Age: 25
End: 2020-04-14

## 2021-04-04 ENCOUNTER — HOSPITAL ENCOUNTER (EMERGENCY)
Age: 26
Discharge: HOME | End: 2021-04-04
Attending: EMERGENCY MEDICINE
Payer: COMMERCIAL

## 2021-04-04 ENCOUNTER — APPOINTMENT (OUTPATIENT)
Dept: CT IMAGING | Age: 26
End: 2021-04-04
Payer: COMMERCIAL

## 2021-04-04 VITALS
RESPIRATION RATE: 16 BRPM | TEMPERATURE: 98.3 F | BODY MASS INDEX: 33.72 KG/M2 | HEART RATE: 58 BPM | DIASTOLIC BLOOD PRESSURE: 63 MMHG | OXYGEN SATURATION: 100 % | HEIGHT: 64 IN | WEIGHT: 197.53 LBS | SYSTOLIC BLOOD PRESSURE: 105 MMHG

## 2021-04-04 DIAGNOSIS — N13.30 HYDRONEPHROSIS OF LEFT KIDNEY: ICD-10-CM

## 2021-04-04 DIAGNOSIS — N20.0 LEFT NEPHROLITHIASIS: Primary | ICD-10-CM

## 2021-04-04 LAB
A/G RATIO: 1.6 (ref 1.1–2.2)
ALBUMIN SERPL-MCNC: 4.1 G/DL (ref 3.4–5)
ALP BLD-CCNC: 71 U/L (ref 40–129)
ALT SERPL-CCNC: 8 U/L (ref 10–40)
ANION GAP SERPL CALCULATED.3IONS-SCNC: 12 MMOL/L (ref 3–16)
AST SERPL-CCNC: 13 U/L (ref 15–37)
BACTERIA: ABNORMAL /HPF
BASOPHILS ABSOLUTE: 0 K/UL (ref 0–0.2)
BASOPHILS RELATIVE PERCENT: 0.3 %
BILIRUB SERPL-MCNC: 0.4 MG/DL (ref 0–1)
BILIRUBIN URINE: NEGATIVE
BLOOD, URINE: ABNORMAL
BUN BLDV-MCNC: 17 MG/DL (ref 7–20)
CALCIUM SERPL-MCNC: 9.2 MG/DL (ref 8.3–10.6)
CHLORIDE BLD-SCNC: 107 MMOL/L (ref 99–110)
CLARITY: CLEAR
CO2: 21 MMOL/L (ref 21–32)
COLOR: YELLOW
CREAT SERPL-MCNC: 1 MG/DL (ref 0.6–1.1)
EOSINOPHILS ABSOLUTE: 0.1 K/UL (ref 0–0.6)
EOSINOPHILS RELATIVE PERCENT: 0.8 %
EPITHELIAL CELLS, UA: 4 /HPF (ref 0–5)
GFR AFRICAN AMERICAN: >60
GFR NON-AFRICAN AMERICAN: >60
GLOBULIN: 2.6 G/DL
GLUCOSE BLD-MCNC: 108 MG/DL (ref 70–99)
GLUCOSE URINE: NEGATIVE MG/DL
HCG(URINE) PREGNANCY TEST: NEGATIVE
HCT VFR BLD CALC: 41.1 % (ref 36–48)
HEMOGLOBIN: 14.3 G/DL (ref 12–16)
HYALINE CASTS: 2 /LPF (ref 0–8)
KETONES, URINE: NEGATIVE MG/DL
LEUKOCYTE ESTERASE, URINE: ABNORMAL
LYMPHOCYTES ABSOLUTE: 2.1 K/UL (ref 1–5.1)
LYMPHOCYTES RELATIVE PERCENT: 20 %
MCH RBC QN AUTO: 30.5 PG (ref 26–34)
MCHC RBC AUTO-ENTMCNC: 34.7 G/DL (ref 31–36)
MCV RBC AUTO: 87.9 FL (ref 80–100)
MICROSCOPIC EXAMINATION: YES
MONOCYTES ABSOLUTE: 0.5 K/UL (ref 0–1.3)
MONOCYTES RELATIVE PERCENT: 4.5 %
NEUTROPHILS ABSOLUTE: 7.7 K/UL (ref 1.7–7.7)
NEUTROPHILS RELATIVE PERCENT: 74.4 %
NITRITE, URINE: NEGATIVE
PDW BLD-RTO: 13.1 % (ref 12.4–15.4)
PH UA: 6.5 (ref 5–8)
PLATELET # BLD: 235 K/UL (ref 135–450)
PMV BLD AUTO: 8.2 FL (ref 5–10.5)
POTASSIUM REFLEX MAGNESIUM: 3.9 MMOL/L (ref 3.5–5.1)
PROTEIN UA: ABNORMAL MG/DL
RBC # BLD: 4.68 M/UL (ref 4–5.2)
RBC UA: 9 /HPF (ref 0–4)
SODIUM BLD-SCNC: 140 MMOL/L (ref 136–145)
SPECIFIC GRAVITY UA: 1.02 (ref 1–1.03)
TOTAL PROTEIN: 6.7 G/DL (ref 6.4–8.2)
URINE REFLEX TO CULTURE: ABNORMAL
URINE TYPE: ABNORMAL
UROBILINOGEN, URINE: 0.2 E.U./DL
WBC # BLD: 10.3 K/UL (ref 4–11)
WBC UA: 5 /HPF (ref 0–5)

## 2021-04-04 PROCEDURE — 99284 EMERGENCY DEPT VISIT MOD MDM: CPT

## 2021-04-04 PROCEDURE — 74176 CT ABD & PELVIS W/O CONTRAST: CPT

## 2021-04-04 PROCEDURE — 85025 COMPLETE CBC W/AUTO DIFF WBC: CPT

## 2021-04-04 PROCEDURE — 81001 URINALYSIS AUTO W/SCOPE: CPT

## 2021-04-04 PROCEDURE — 84703 CHORIONIC GONADOTROPIN ASSAY: CPT

## 2021-04-04 PROCEDURE — 80053 COMPREHEN METABOLIC PANEL: CPT

## 2021-04-04 PROCEDURE — 6360000002 HC RX W HCPCS: Performed by: EMERGENCY MEDICINE

## 2021-04-04 PROCEDURE — 96374 THER/PROPH/DIAG INJ IV PUSH: CPT

## 2021-04-04 PROCEDURE — 96375 TX/PRO/DX INJ NEW DRUG ADDON: CPT

## 2021-04-04 PROCEDURE — 2580000003 HC RX 258: Performed by: EMERGENCY MEDICINE

## 2021-04-04 RX ORDER — TAMSULOSIN HYDROCHLORIDE 0.4 MG/1
0.4 CAPSULE ORAL DAILY
Qty: 5 CAPSULE | Refills: 0 | Status: SHIPPED | OUTPATIENT
Start: 2021-04-04 | End: 2021-05-09 | Stop reason: ALTCHOICE

## 2021-04-04 RX ORDER — KETOROLAC TROMETHAMINE 30 MG/ML
30 INJECTION, SOLUTION INTRAMUSCULAR; INTRAVENOUS ONCE
Status: COMPLETED | OUTPATIENT
Start: 2021-04-04 | End: 2021-04-04

## 2021-04-04 RX ORDER — KETOROLAC TROMETHAMINE 10 MG/1
10 TABLET, FILM COATED ORAL EVERY 6 HOURS PRN
Qty: 20 TABLET | Refills: 0 | Status: SHIPPED | OUTPATIENT
Start: 2021-04-04 | End: 2021-05-09 | Stop reason: ALTCHOICE

## 2021-04-04 RX ORDER — ONDANSETRON 4 MG/1
4 TABLET, ORALLY DISINTEGRATING ORAL EVERY 8 HOURS PRN
Qty: 20 TABLET | Refills: 0 | Status: SHIPPED | OUTPATIENT
Start: 2021-04-04 | End: 2021-05-09 | Stop reason: ALTCHOICE

## 2021-04-04 RX ORDER — 0.9 % SODIUM CHLORIDE 0.9 %
1000 INTRAVENOUS SOLUTION INTRAVENOUS ONCE
Status: COMPLETED | OUTPATIENT
Start: 2021-04-04 | End: 2021-04-04

## 2021-04-04 RX ORDER — ONDANSETRON 2 MG/ML
4 INJECTION INTRAMUSCULAR; INTRAVENOUS ONCE
Status: COMPLETED | OUTPATIENT
Start: 2021-04-04 | End: 2021-04-04

## 2021-04-04 RX ADMIN — SODIUM CHLORIDE 1000 ML: 9 INJECTION, SOLUTION INTRAVENOUS at 07:52

## 2021-04-04 RX ADMIN — ONDANSETRON 4 MG: 2 INJECTION INTRAMUSCULAR; INTRAVENOUS at 07:45

## 2021-04-04 RX ADMIN — KETOROLAC TROMETHAMINE 30 MG: 30 INJECTION, SOLUTION INTRAMUSCULAR at 07:46

## 2021-04-04 ASSESSMENT — PAIN DESCRIPTION - ONSET: ONSET: ON-GOING

## 2021-04-04 ASSESSMENT — PAIN DESCRIPTION - ORIENTATION: ORIENTATION: LEFT

## 2021-04-04 ASSESSMENT — PAIN DESCRIPTION - PROGRESSION: CLINICAL_PROGRESSION: NOT CHANGED

## 2021-04-04 ASSESSMENT — PAIN DESCRIPTION - PAIN TYPE: TYPE: ACUTE PAIN

## 2021-04-04 ASSESSMENT — PAIN DESCRIPTION - LOCATION: LOCATION: FLANK

## 2021-04-04 ASSESSMENT — PAIN SCALES - GENERAL
PAINLEVEL_OUTOF10: 1
PAINLEVEL_OUTOF10: 3

## 2021-04-04 ASSESSMENT — PAIN - FUNCTIONAL ASSESSMENT: PAIN_FUNCTIONAL_ASSESSMENT: PREVENTS OR INTERFERES WITH MANY ACTIVE NOT PASSIVE ACTIVITIES

## 2021-04-04 NOTE — ED NOTES
Pt to ED with c/o left side flank pain that radiates to her abdomen, started at 0430, emesis started at 0800.      Conor Pereyra RN  04/04/21 7742

## 2021-04-04 NOTE — ED PROVIDER NOTES
CHIEF COMPLAINT  Flank Pain (left side, radiates to her abdomen, started at 0430, emesis started at 0800)      HISTORY OF PRESENT ILLNESS  Joel Caballero is a 30 y.o. female who presents to the ED complaining of left-sided flank pain that started at 4:30 AM this morning. Patient states the pain is an aching sensation that radiates towards her left lower quadrant. States she has had associated nausea and vomiting. Denies any fevers or chills. States the pain is been constant. Denies any exacerbating relieving factors. Denies any dysuria increasing frequency of urination. Normal bowel movements. Patient states she did have a regular menstrual cycle 3 weeks ago but does not think she is pregnant. Denies any vaginal discharge or vaginal bleeding currently. States she has had  section x2. Denies any other abdominal surgery. No other complaints, modifying factors or associated symptoms. Nursing notes reviewed.    Past Medical History:   Diagnosis Date    Anemia     with 1st pregnancy    Knee cap dislocation 2012    Mental disorder     depression-takes prozac    Tear of MCL (medial collateral ligament) of knee 2012     Past Surgical History:   Procedure Laterality Date     SECTION N/A 2020     SECTION performed by [facility] at [facility]     SECTION, LOW TRANSVERSE      repeat scheduled 20     Family History   Problem Relation Age of Onset    Asthma Brother     High Cholesterol Paternal Grandmother     High Blood Pressure Paternal Grandmother     High Blood Pressure Paternal Grandfather     Cancer Maternal Grandmother      Social History     Socioeconomic History    Marital status:      Spouse name: Not on file    Number of children: Not on file    Years of education: Not on file    Highest education level: Not on file   Occupational History    Not on file   Social Needs    Financial resource strain: Not on file  Food insecurity     Worry: Not on file     Inability: Not on file    Transportation needs     Medical: Not on file     Non-medical: Not on file   Tobacco Use    Smoking status: Never Smoker    Smokeless tobacco: Never Used   Substance and Sexual Activity    Alcohol use: No    Drug use: No    Sexual activity: Yes     Partners: Male   Lifestyle    Physical activity     Days per week: Not on file     Minutes per session: Not on file    Stress: Not on file   Relationships    Social connections     Talks on phone: Not on file     Gets together: Not on file     Attends Am[employer] service: Not on file     Active member of club or organization: Not on file     Attends meetings of clubs or organizations: Not on file     Relationship status: Not on file    Intimate partner violence     Fear of current or ex partner: Not on file     Emotionally abused: Not on file     Physically abused: Not on file     Forced sexual activity: Not on file   Other Topics Concern    Not on file   Social History Narrative    Not on file     No current facility-administered medications for this encounter.       Current Outpatient Medications   Medication Sig Dispense Refill    tamsulosin (FLOMAX) 0.4 MG capsule Take 1 capsule by mouth daily for 5 doses 5 capsule 0    ketorolac (TORADOL) 10 MG tablet Take 1 tablet by mouth every 6 hours as needed for Pain 20 tablet 0    ondansetron (ZOFRAN ODT) 4 MG disintegrating tablet Take 1 tablet by mouth every 8 hours as needed for Nausea 20 tablet 0    acetaminophen (APAP EXTRA STRENGTH) 500 MG tablet Take 2 tablets by mouth every 6 hours as needed for Pain 120 tablet 3    docusate sodium (COLACE) 100 MG capsule Take 1 capsule by mouth daily as needed for Constipation 60 capsule 1    FLUoxetine (PROZAC) 10 MG capsule Take 10 mg by mouth daily      Prenatal MV-Min-Fe Fum-FA-DHA (PRENATAL 1 PO) Take 1 tablet by mouth daily       No Known Allergies      REVIEW OF SYSTEMS  10 systems reviewed, pertinent positives per HPI otherwise noted to be negative    PHYSICAL EXAM  BP (!) 109/58   Pulse 54   Temp 98.8 °F (37.1 °C) (Oral)   Resp 15   Ht 5' 4\" (1.626 m)   Wt 197 lb 8.5 oz (89.6 kg)   LMP 04/02/2021   SpO2 100%   Breastfeeding No   BMI 33.91 kg/m²      CONSTITUTIONAL: AOx4, cooperative with exam, afebrile   HEAD: normocephalic, atraumatic   EYES: PERRL, EOMI, anicteric sclera   ENT: Moist mucous membranes, uvula midline   BACK: Symmetric, no deformity, left CVA tenderness, no right CVA tenderness   LUNGS: Bilateral breath sounds, CTAB, no rales/ronchi/wheezes   CARDIOVASCULAR: RRR, normal S1/S2, no m/r/g, 2+ pulses throughout   ABDOMEN: Soft, left lower quadrant tenderness to palpation, no rebound tenderness or guarding, no palpable masses, non-distended, +BS   NEUROLOGIC:  MAEx4, GCS 15   MUSCULOSKELETAL: No clubbing, cyanosis or edema   SKIN: No rash, pallor or wounds on exposed surfaces         RADIOLOGY  X-RAYS:  I have reviewed radiologic plain film image(s). ALL OTHER NON-PLAIN FILM IMAGES SUCH AS CT, ULTRASOUND AND MRI HAVE BEEN READ BY THE RADIOLOGIST. CT ABDOMEN PELVIS WO CONTRAST Additional Contrast? None   Final Result   6 mm stone proximal left ureter. Mild-to-moderate left hydronephrosis. Additional renal stones measuring up to 8 mm. EKG INTERPRETATION  None    PROCEDURES    ED COURSE/MDM  Nephrolithiasis, UTI, pyelonephritis, hydronephrosis, constipation, colitis  Patient seen and evaluated. History and physical as above. Nontoxic, afebrile. Patient complaining of left flank pain and does have left CVA tenderness. No peritoneal signs on abdominal exam but does have mild left lower quadrant tenderness. Patient does appear uncomfortable on arrival.  Suspicion is nephrolithiasis. Plan for routine labs, urinalysis, urine pregnancy, Toradol, Zofran and IV fluid bolus. We will also obtain CT abdomen pelvis.   ED Course as of Apr 04 0931   Sun Apr 04, 2021   1455 Patient reassessed. Patient resting comfortably. States that she no longer is in any pain and does not feel nauseous. Updated patient that her blood works unremarkable. Does have some blood in the urine and review of CT abdomen pelvis does appear that she has left-sided nephrolithiasis with mild hydronephrosis. Awaiting final read from CT abdomen pelvis. [DS]   0318 CT abdomen pelvis does show a left-sided 6 mm stone with mild to moderate hydronephrosis. Patient updated on results. Discussed discharge with Flomax, Toradol and Zofran. Patient agreeable. Return instructions provided. All questions answered prior to discharge. Patient provided information for follow-up with urology at discharge. [DS]      ED Course User Index  [DS] Jackson Gayle MD       Patient was given scripts for the following medications. I counseled patient how to take these medications. New Prescriptions    KETOROLAC (TORADOL) 10 MG TABLET    Take 1 tablet by mouth every 6 hours as needed for Pain    ONDANSETRON (ZOFRAN ODT) 4 MG DISINTEGRATING TABLET    Take 1 tablet by mouth every 8 hours as needed for Nausea    TAMSULOSIN (FLOMAX) 0.4 MG CAPSULE    Take 1 capsule by mouth daily for 5 doses           CLINICAL IMPRESSION  1. Left nephrolithiasis    2. Hydronephrosis of left kidney        Blood pressure (!) 109/58, pulse 54, temperature 98.8 °F (37.1 °C), temperature source Oral, resp. rate 15, height 5' 4\" (1.626 m), weight 197 lb 8.5 oz (89.6 kg), last menstrual period 04/02/2021, SpO2 100 %, not currently breastfeeding. DISPOSITION  Patient was discharged to home in good condition. Sheba Worthington MD  [address] [facility], [address] [facility] [address] [facility]    Call today  For a follow up appointment. Julia Yan MD  [address] [place] [address] [phone]    Schedule an appointment as soon as possible for a visit   As needed with urology. .       Disclaimer:

## 2021-04-04 NOTE — ED NOTES
Discharge and education instructions reviewed. Patient verbalized understanding, teach-back successful. Patient denied questions at this time. No acute distress noted. Patient instructed to follow-up as noted - return to emergency department if symptoms worsen. Patient verbalized understanding. Discharged per EDMD with discharge instructions.         Abby Jones RN  04/04/21 3028

## 2021-04-23 ENCOUNTER — HOSPITAL ENCOUNTER (OUTPATIENT)
Age: 26
Discharge: HOME | End: 2021-04-23
Payer: COMMERCIAL

## 2021-04-23 ENCOUNTER — HOSPITAL ENCOUNTER (OUTPATIENT)
Dept: ULTRASOUND IMAGING | Age: 26
Discharge: HOME | End: 2021-04-23
Payer: COMMERCIAL

## 2021-04-23 ENCOUNTER — HOSPITAL ENCOUNTER (OUTPATIENT)
Dept: GENERAL RADIOLOGY | Age: 26
Discharge: HOME | End: 2021-04-23
Payer: COMMERCIAL

## 2021-04-23 DIAGNOSIS — N20.1 CALCULUS OF URETER: ICD-10-CM

## 2021-04-23 DIAGNOSIS — N20.0 CALCULUS OF KIDNEY: ICD-10-CM

## 2021-04-23 DIAGNOSIS — N13.2 HYDRONEPHROSIS WITH RENAL AND URETERAL CALCULUS OBSTRUCTION: ICD-10-CM

## 2021-04-23 PROCEDURE — 76770 US EXAM ABDO BACK WALL COMP: CPT

## 2021-04-23 PROCEDURE — 74018 RADEX ABDOMEN 1 VIEW: CPT

## 2021-05-09 ENCOUNTER — APPOINTMENT (OUTPATIENT)
Dept: CT IMAGING | Age: 26
End: 2021-05-09
Payer: COMMERCIAL

## 2021-05-09 ENCOUNTER — HOSPITAL ENCOUNTER (EMERGENCY)
Age: 26
Discharge: HOME | End: 2021-05-09
Attending: STUDENT IN AN ORGANIZED HEALTH CARE EDUCATION/TRAINING PROGRAM
Payer: COMMERCIAL

## 2021-05-09 VITALS
RESPIRATION RATE: 18 BRPM | DIASTOLIC BLOOD PRESSURE: 77 MMHG | OXYGEN SATURATION: 99 % | TEMPERATURE: 99.4 F | HEART RATE: 98 BPM | SYSTOLIC BLOOD PRESSURE: 119 MMHG

## 2021-05-09 DIAGNOSIS — N20.1 URETEROLITHIASIS: Primary | ICD-10-CM

## 2021-05-09 LAB
A/G RATIO: 1.7 (ref 1.1–2.2)
ALBUMIN SERPL-MCNC: 4.1 G/DL (ref 3.4–5)
ALP BLD-CCNC: 70 U/L (ref 40–129)
ALT SERPL-CCNC: 11 U/L (ref 10–40)
ANION GAP SERPL CALCULATED.3IONS-SCNC: 10 MMOL/L (ref 3–16)
AST SERPL-CCNC: 13 U/L (ref 15–37)
BACTERIA: ABNORMAL /HPF
BASOPHILS ABSOLUTE: 0 K/UL (ref 0–0.2)
BASOPHILS RELATIVE PERCENT: 0.3 %
BILIRUB SERPL-MCNC: <0.2 MG/DL (ref 0–1)
BILIRUBIN URINE: ABNORMAL
BLOOD, URINE: ABNORMAL
BUN BLDV-MCNC: 14 MG/DL (ref 7–20)
CALCIUM SERPL-MCNC: 9 MG/DL (ref 8.3–10.6)
CHLORIDE BLD-SCNC: 108 MMOL/L (ref 99–110)
CLARITY: ABNORMAL
CO2: 21 MMOL/L (ref 21–32)
COLOR: YELLOW
CREAT SERPL-MCNC: 1 MG/DL (ref 0.6–1.1)
EOSINOPHILS ABSOLUTE: 0.1 K/UL (ref 0–0.6)
EOSINOPHILS RELATIVE PERCENT: 1.3 %
EPITHELIAL CELLS, UA: ABNORMAL /HPF (ref 0–5)
GFR AFRICAN AMERICAN: >60
GFR NON-AFRICAN AMERICAN: >60
GLOBULIN: 2.4 G/DL
GLUCOSE BLD-MCNC: 103 MG/DL (ref 70–99)
GLUCOSE URINE: NEGATIVE MG/DL
HCG QUALITATIVE: NEGATIVE
HCT VFR BLD CALC: 40.1 % (ref 36–48)
HEMOGLOBIN: 13.7 G/DL (ref 12–16)
HYALINE CASTS: ABNORMAL /LPF (ref 0–2)
KETONES, URINE: ABNORMAL MG/DL
LEUKOCYTE ESTERASE, URINE: ABNORMAL
LIPASE: 32 U/L (ref 13–60)
LYMPHOCYTES ABSOLUTE: 2.2 K/UL (ref 1–5.1)
LYMPHOCYTES RELATIVE PERCENT: 20.4 %
MCH RBC QN AUTO: 29.8 PG (ref 26–34)
MCHC RBC AUTO-ENTMCNC: 34.2 G/DL (ref 31–36)
MCV RBC AUTO: 87.2 FL (ref 80–100)
MICROSCOPIC EXAMINATION: YES
MONOCYTES ABSOLUTE: 0.6 K/UL (ref 0–1.3)
MONOCYTES RELATIVE PERCENT: 5.3 %
NEUTROPHILS ABSOLUTE: 7.9 K/UL (ref 1.7–7.7)
NEUTROPHILS RELATIVE PERCENT: 72.7 %
NITRITE, URINE: NEGATIVE
PDW BLD-RTO: 13.3 % (ref 12.4–15.4)
PH UA: 6 (ref 5–8)
PLATELET # BLD: 206 K/UL (ref 135–450)
PMV BLD AUTO: 8.8 FL (ref 5–10.5)
POTASSIUM REFLEX MAGNESIUM: 3.8 MMOL/L (ref 3.5–5.1)
PROTEIN UA: 30 MG/DL
RBC # BLD: 4.59 M/UL (ref 4–5.2)
RBC UA: ABNORMAL /HPF (ref 0–4)
SODIUM BLD-SCNC: 139 MMOL/L (ref 136–145)
SPECIFIC GRAVITY UA: 1.02 (ref 1–1.03)
TOTAL PROTEIN: 6.5 G/DL (ref 6.4–8.2)
URINE REFLEX TO CULTURE: YES
URINE TYPE: ABNORMAL
UROBILINOGEN, URINE: 0.2 E.U./DL
WBC # BLD: 10.9 K/UL (ref 4–11)
WBC UA: ABNORMAL /HPF (ref 0–5)

## 2021-05-09 PROCEDURE — 81001 URINALYSIS AUTO W/SCOPE: CPT

## 2021-05-09 PROCEDURE — 96374 THER/PROPH/DIAG INJ IV PUSH: CPT

## 2021-05-09 PROCEDURE — 99283 EMERGENCY DEPT VISIT LOW MDM: CPT

## 2021-05-09 PROCEDURE — 36415 COLL VENOUS BLD VENIPUNCTURE: CPT

## 2021-05-09 PROCEDURE — 74176 CT ABD & PELVIS W/O CONTRAST: CPT

## 2021-05-09 PROCEDURE — 96375 TX/PRO/DX INJ NEW DRUG ADDON: CPT

## 2021-05-09 PROCEDURE — 84703 CHORIONIC GONADOTROPIN ASSAY: CPT

## 2021-05-09 PROCEDURE — 2580000003 HC RX 258: Performed by: STUDENT IN AN ORGANIZED HEALTH CARE EDUCATION/TRAINING PROGRAM

## 2021-05-09 PROCEDURE — 83690 ASSAY OF LIPASE: CPT

## 2021-05-09 PROCEDURE — 80053 COMPREHEN METABOLIC PANEL: CPT

## 2021-05-09 PROCEDURE — 6360000002 HC RX W HCPCS: Performed by: STUDENT IN AN ORGANIZED HEALTH CARE EDUCATION/TRAINING PROGRAM

## 2021-05-09 PROCEDURE — 87086 URINE CULTURE/COLONY COUNT: CPT

## 2021-05-09 PROCEDURE — 85025 COMPLETE CBC W/AUTO DIFF WBC: CPT

## 2021-05-09 PROCEDURE — 6370000000 HC RX 637 (ALT 250 FOR IP): Performed by: STUDENT IN AN ORGANIZED HEALTH CARE EDUCATION/TRAINING PROGRAM

## 2021-05-09 RX ORDER — CEFUROXIME AXETIL 250 MG/1
250 TABLET ORAL 2 TIMES DAILY
Qty: 14 TABLET | Refills: 0 | Status: SHIPPED | OUTPATIENT
Start: 2021-05-09 | End: 2021-05-16

## 2021-05-09 RX ORDER — IBUPROFEN 600 MG/1
600 TABLET ORAL 4 TIMES DAILY PRN
Qty: 40 TABLET | Refills: 0 | Status: SHIPPED | OUTPATIENT
Start: 2021-05-09

## 2021-05-09 RX ORDER — SODIUM CHLORIDE, SODIUM LACTATE, POTASSIUM CHLORIDE, AND CALCIUM CHLORIDE .6; .31; .03; .02 G/100ML; G/100ML; G/100ML; G/100ML
1000 INJECTION, SOLUTION INTRAVENOUS ONCE
Status: COMPLETED | OUTPATIENT
Start: 2021-05-09 | End: 2021-05-09

## 2021-05-09 RX ORDER — TAMSULOSIN HYDROCHLORIDE 0.4 MG/1
0.4 CAPSULE ORAL DAILY
Qty: 14 CAPSULE | Refills: 0 | Status: SHIPPED | OUTPATIENT
Start: 2021-05-09

## 2021-05-09 RX ORDER — ONDANSETRON 4 MG/1
4 TABLET, ORALLY DISINTEGRATING ORAL EVERY 8 HOURS PRN
Qty: 20 TABLET | Refills: 0 | Status: SHIPPED | OUTPATIENT
Start: 2021-05-09

## 2021-05-09 RX ORDER — ONDANSETRON 2 MG/ML
8 INJECTION INTRAMUSCULAR; INTRAVENOUS ONCE
Status: COMPLETED | OUTPATIENT
Start: 2021-05-09 | End: 2021-05-09

## 2021-05-09 RX ORDER — KETOROLAC TROMETHAMINE 30 MG/ML
15 INJECTION, SOLUTION INTRAMUSCULAR; INTRAVENOUS ONCE
Status: COMPLETED | OUTPATIENT
Start: 2021-05-09 | End: 2021-05-09

## 2021-05-09 RX ORDER — TAMSULOSIN HYDROCHLORIDE 0.4 MG/1
0.4 CAPSULE ORAL ONCE
Status: COMPLETED | OUTPATIENT
Start: 2021-05-09 | End: 2021-05-09

## 2021-05-09 RX ADMIN — SODIUM CHLORIDE, POTASSIUM CHLORIDE, SODIUM LACTATE AND CALCIUM CHLORIDE 1000 ML: 600; 310; 30; 20 INJECTION, SOLUTION INTRAVENOUS at 02:35

## 2021-05-09 RX ADMIN — ONDANSETRON 8 MG: 2 INJECTION INTRAMUSCULAR; INTRAVENOUS at 02:24

## 2021-05-09 RX ADMIN — KETOROLAC TROMETHAMINE 15 MG: 30 INJECTION, SOLUTION INTRAMUSCULAR; INTRAVENOUS at 02:24

## 2021-05-09 RX ADMIN — TAMSULOSIN HYDROCHLORIDE 0.4 MG: 0.4 CAPSULE ORAL at 02:24

## 2021-05-09 ASSESSMENT — PAIN DESCRIPTION - ORIENTATION: ORIENTATION: RIGHT

## 2021-05-09 ASSESSMENT — PAIN DESCRIPTION - PAIN TYPE: TYPE: ACUTE PAIN

## 2021-05-09 ASSESSMENT — PAIN DESCRIPTION - FREQUENCY: FREQUENCY: CONTINUOUS

## 2021-05-09 ASSESSMENT — PAIN DESCRIPTION - DESCRIPTORS: DESCRIPTORS: ACHING

## 2021-05-09 ASSESSMENT — PAIN SCALES - GENERAL: PAINLEVEL_OUTOF10: 8

## 2021-05-09 NOTE — ED PROVIDER NOTES
[address] [facility]      Pt Name: Evelia Witt  MRN: [id number]  [place] 1995  Date of evaluation: 5/9/2021  Provider: Keaton Rinaldi MD    CHIEF COMPLAINT       Chief Complaint   Patient presents with    Flank Pain     Hx of kidney stones, complians of right flank pain today with nausea and vomiting, has urology appointment Tuesday         HISTORY OF PRESENT ILLNESS   (Location/Symptom, Timing/Onset,Context/Setting, Quality, Duration, Modifying Factors, Severity)  Note limiting factors. Evelia Witt is a 30 y.o. female who presents to the emergency department complaining of right flank pain for the past day. Onset gradual, progressively and rapidly worse, similar to pain that she had for prior kidney stone that was on the left. Associated with nausea, vomiting, urinary hesitancy, hematuria. Denies fevers, shortness of breath. Has outpatient urology follow-up this coming Tuesday. Was able to pass her last kidney stone that was 6 mm. Symptoms not otherwise alleviated or exacerbated by other factors. NursingNotes were reviewed. REVIEW OF SYSTEMS    (2-9 systems for level 4, 10 or more for level 5)       Constitutional: No fever or chills. Eye: No visual disturbances. No eye pain. Ear/Nose/Mouth/Throat: No nasal congestion. No sore throat. Respiratory: No cough, No shortness of breath, No sputum production. Cardiovascular: No chest pain. No palpitations. Gastrointestinal: No abdominal pain. +nausea or vomiting  Genitourinary: No dysuria. +hematuria. + Right flank pain. Hematology/Lymphatics: No bleeding or bruising tendency. Immunologic: No malaise. No swollen glands. Musculoskeletal: No back pain. No joint pain. Integumentary: No rash. No abrasions. Neurologic: No headache. No focal numbness or weakness.       PAST MEDICAL HISTORY     Past Medical History:   Diagnosis Date    Anemia     with 1st pregnancy    Knee cap dislocation 2012    Mental disorder     depression-takes prozac    Tear of MCL (medial collateral ligament) of knee 2012         SURGICALHISTORY       Past Surgical History:   Procedure Laterality Date     SECTION N/A 2020     SECTION performed by Laurence Sims MD at [facility]     SECTION, LOW TRANSVERSE      repeat scheduled 20         CURRENT MEDICATIONS       Previous Medications    ACETAMINOPHEN (APAP EXTRA STRENGTH) 500 MG TABLET    Take 2 tablets by mouth every 6 hours as needed for Pain    DOCUSATE SODIUM (COLACE) 100 MG CAPSULE    Take 1 capsule by mouth daily as needed for Constipation    FLUOXETINE (PROZAC) 10 MG CAPSULE    Take 10 mg by mouth daily    PRENATAL MV-MIN-FE FUM-FA-DHA (PRENATAL 1 PO)    Take 1 tablet by mouth daily       ALLERGIES     Patient has no known allergies.     FAMILY HISTORY       Family History   Problem Relation Age of Onset    Asthma Brother     High Cholesterol Paternal Grandmother     High Blood Pressure Paternal Grandmother     High Blood Pressure Paternal Grandfather     Cancer Maternal Grandmother           SOCIAL HISTORY       Social History     Socioeconomic History    Marital status:      Spouse name: Not on file    Number of children: Not on file    Years of education: Not on file    Highest education level: Not on file   Occupational History    Not on file   Social Needs    Financial resource strain: Not on file    Food insecurity     Worry: Not on file     Inability: Not on file   [employer] needs     Medical: Not on file     Non-medical: Not on file   Tobacco Use    Smoking status: Never Smoker    Smokeless tobacco: Never Used   Substance and Sexual Activity    Alcohol use: No    Drug use: No    Sexual activity: Yes     Partners: Male   Lifestyle    Physical activity     Days per week: Not on file     Minutes per session: Not on file    Stress: Not on file and DIFFERENTIAL DIAGNOSIS/MDM:   Vitals:    Vitals:    218   Pulse: 98   Resp: 18   Temp: 99.4 °F (37.4 °C)   TempSrc: Oral   SpO2: 98%         Medical decision makin-year-old female with history of kidney stones who presents with right-sided flank pain similar to prior stone pain, associated hematuria, nausea and vomiting, significant CVAT on the right, likely renal stone, getting CT to evaluate for obstructive uropathy and stone size given her last stone was fairly sizable and she has a documented 8 mm stone that in the past was not causing obstruction, possibly causing obstruction at this time. She is given medication for pain control, nausea control, will be reassessed. CT demonstrates a right ureteral stone in the distal ureter of approximately 6 mm, patient has passed a 6 mm stone in the past, she has marked improvement in her pain and symptoms with medications. She is able to tolerate p.o., she is amenable to discharge home. She was prescribed Percocet for pain in the past but she states that that was not very helpful, has had marked relief with Toradol and NSAIDs in the past.  She is prescribed NSAIDs and Zofran for her pain and nausea respectively. Given her UA with bacteriuria and some WBCs, covered for potential comorbid UTI, but she is not febrile, not septic, she is given strict return precautions for any signs or symptoms of the infection developing and we discussed what that would look like in lay terms. She has upcoming urology outpatient appointment within 48 hours and encouraged to follow-up for potential intervention if necessary. Patient voices understanding of this, voices understanding of discharge instructions and return precautions given, she was discharged home ambulates steadily from the emergency department upon discharge.     Medications   ketorolac (TORADOL) injection 15 mg (15 mg Intravenous Given 21)   tamsulosin (FLOMAX) capsule 0.4 mg (0.4 mg Oral Given 5/9/21 0224)   ondansetron (ZOFRAN) injection 8 mg (8 mg Intravenous Given 5/9/21 0224)   lactated ringers bolus (0 mLs Intravenous Stopped 5/9/21 0401)           FINAL IMPRESSION      1.  Ureterolithiasis          DISPOSITION/PLAN   DISPOSITION Decision To Discharge 05/09/2021 03:20:42 AM      PATIENT REFERRED TO:  Manny Ramon MD  [address] [facility], [address] [place] [address]    In 3 days      [facility] Emergency Department  [address] [place] [address]  [phone]    If symptoms worsen    your urologist as previously scheduled            DISCHARGE MEDICATIONS:  New Prescriptions    CEFUROXIME (CEFTIN) 250 MG TABLET    Take 1 tablet by mouth 2 times daily for 7 days    IBUPROFEN (ADVIL;MOTRIN) 600 MG TABLET    Take 1 tablet by mouth 4 times daily as needed for Pain    ONDANSETRON (ZOFRAN ODT) 4 MG DISINTEGRATING TABLET    Take 1 tablet by mouth every 8 hours as needed for Nausea    TAMSULOSIN (FLOMAX) 0.4 MG CAPSULE    Take 1 capsule by mouth daily          (Please note that portions of this note were completed with a voice recognition program.Efforts were made to edit the dictations but occasionally words are mis-transcribed.)    Brii Hastings MD (electronically signed)  Attending Emergency Physician          Brii Hastings MD  05/10/21 3296

## 2021-05-10 LAB — URINE CULTURE, ROUTINE: NORMAL

## (undated) DEVICE — WOUND RETRACTOR AND PROTECTOR: Brand: ALEXIS WOUND PROTECTOR-RETRACTOR

## (undated) DEVICE — GARMENT,MEDLINE,DVT,INT,CALF,MED, GEN2: Brand: MEDLINE

## (undated) DEVICE — 9165 UNIVERSAL PATIENT PLATE: Brand: 3M™

## (undated) DEVICE — PACK PROCEDURE SURG C SECT REV 1

## (undated) DEVICE — PAD DRESSING TELFA OUCHLESS NONADH 3X8IN

## (undated) DEVICE — BANDAGE COMPR W3INXL5YD COT ZN OXIDE TAN E ADH HVYWT

## (undated) DEVICE — TRAY URIN CATH 16FR DRNGE BG STATLOK STBL DEV F SURSTP

## (undated) DEVICE — SUTURE MCRYL SZ 4-0 L18IN ABSRB UD L19MM PS-2 3/8 CIR PRIM Y496G

## (undated) DEVICE — STAPLER SKIN SQ 30 ABSRB STPL DISP INSORB

## (undated) DEVICE — CHLORAPREP 26ML ORANGE

## (undated) DEVICE — Z CONVERTED USE 2275207 CLOTH PREP W7.5XL7.5IN 2% CHG SKIN ALC AND RNS FREE

## (undated) DEVICE — SUTURE VCRL SZ 3-0 L27IN ABSRB UD L36MM CT-1 1/2 CIR J258H

## (undated) DEVICE — GLOVE SURG SZ 65 THK91MIL LTX FREE SYN POLYISOPRENE

## (undated) DEVICE — SUTURE COAT VCRL SZ 0 L36IN ABSRB VLT CTX L48MM TAPERPOINT J370H

## (undated) DEVICE — Z DUP USE 2257490 ADHESIVE SKIN CLSRE 036ML TPCL 2CTL CNCRLTE HIGH VSCSTY DRMB

## (undated) DEVICE — PENCIL SMK EVAC L10FT TBNG NONSTICK ESU BLDE PLUMEPEN ELITE

## (undated) DEVICE — SUTURE VCRL SZ 0 L36IN ABSRB UD L36MM CT-1 1/2 CIR J946H